# Patient Record
Sex: MALE | Race: WHITE | Employment: OTHER | URBAN - METROPOLITAN AREA
[De-identification: names, ages, dates, MRNs, and addresses within clinical notes are randomized per-mention and may not be internally consistent; named-entity substitution may affect disease eponyms.]

---

## 2021-04-26 ENCOUNTER — OFFICE VISIT (OUTPATIENT)
Dept: OPHTHALMOLOGY | Facility: CLINIC | Age: 76
End: 2021-04-26
Attending: OPHTHALMOLOGY
Payer: COMMERCIAL

## 2021-04-26 DIAGNOSIS — H35.3130 BILATERAL NONEXUDATIVE AGE-RELATED MACULAR DEGENERATION, UNSPECIFIED STAGE: Primary | ICD-10-CM

## 2021-04-26 DIAGNOSIS — E11.9 TYPE 2 DIABETES MELLITUS WITHOUT COMPLICATION, WITHOUT LONG-TERM CURRENT USE OF INSULIN (H): ICD-10-CM

## 2021-04-26 PROCEDURE — 92250 FUNDUS PHOTOGRAPHY W/I&R: CPT | Performed by: OPHTHALMOLOGY

## 2021-04-26 PROCEDURE — 92134 CPTRZ OPH DX IMG PST SGM RTA: CPT | Performed by: OPHTHALMOLOGY

## 2021-04-26 PROCEDURE — G0463 HOSPITAL OUTPT CLINIC VISIT: HCPCS

## 2021-04-26 PROCEDURE — 99203 OFFICE O/P NEW LOW 30 MIN: CPT | Performed by: OPHTHALMOLOGY

## 2021-04-26 PROCEDURE — 99207 FUNDUS AUTOFLUORESCENCE IMAGE (FAF) OU (BOTH EYES): CPT | Performed by: OPHTHALMOLOGY

## 2021-04-26 RX ORDER — ATORVASTATIN CALCIUM 10 MG/1
TABLET, FILM COATED ORAL
COMMUNITY
Start: 2021-03-20

## 2021-04-26 RX ORDER — ASPIRIN 81 MG/1
TABLET, COATED ORAL
COMMUNITY
Start: 2020-09-21

## 2021-04-26 RX ORDER — METFORMIN HCL 500 MG
TABLET, EXTENDED RELEASE 24 HR ORAL
COMMUNITY
Start: 2021-03-22

## 2021-04-26 RX ORDER — EMPAGLIFLOZIN 10 MG/1
TABLET, FILM COATED ORAL
COMMUNITY
Start: 2021-04-15

## 2021-04-26 RX ORDER — LATANOPROST 50 UG/ML
SOLUTION/ DROPS OPHTHALMIC
COMMUNITY
Start: 2021-04-26

## 2021-04-26 RX ORDER — METOPROLOL SUCCINATE 25 MG/1
TABLET, EXTENDED RELEASE ORAL
COMMUNITY
Start: 2020-12-08

## 2021-04-26 RX ORDER — LISINOPRIL 5 MG/1
TABLET ORAL
COMMUNITY
Start: 2021-01-29

## 2021-04-26 RX ORDER — TRIAMCINOLONE ACETONIDE 1 MG/G
CREAM TOPICAL
COMMUNITY
Start: 2020-11-13

## 2021-04-26 ASSESSMENT — REFRACTION_WEARINGRX
OD_SPHERE: -0.25
OD_AXIS: 171
OS_SPHERE: -0.75
OD_ADD: +3.00
OS_CYLINDER: +1.50
OS_AXIS: 017
OD_CYLINDER: +1.25
SPECS_TYPE: PAL
OS_ADD: +3.00

## 2021-04-26 ASSESSMENT — VISUAL ACUITY
CORRECTION_TYPE: GLASSES
OD_CC+: -1
METHOD: SNELLEN - LINEAR
OD_CC: 20/100
OS_CC: 20/100

## 2021-04-26 ASSESSMENT — SLIT LAMP EXAM - LIDS
COMMENTS: NORMAL
COMMENTS: NORMAL

## 2021-04-26 ASSESSMENT — EXTERNAL EXAM - LEFT EYE: OS_EXAM: NORMAL

## 2021-04-26 ASSESSMENT — TONOMETRY
OD_IOP_MMHG: 16
IOP_METHOD: TONOPEN
OS_IOP_MMHG: 18

## 2021-04-26 ASSESSMENT — EXTERNAL EXAM - RIGHT EYE: OD_EXAM: NORMAL

## 2021-04-26 ASSESSMENT — CONF VISUAL FIELD
METHOD: COUNTING FINGERS
OD_NORMAL: 1
OS_NORMAL: 1

## 2021-04-26 NOTE — PROGRESS NOTES
I have confirmed the patient's and reviewed Past Medical History, Past Surgical History, Social History, Family History, Problem List, Medication List and agree with Tech note.    CC: blurry vision both eyes     HPI: has dry Age related macular degeneration, wants to learn more about trgeatment    Assessment/plan:   1.  Dry Age related macular degeneration both eyes    - AREDS stage IV     - Patient should take AREDS vitamins   - FAF annually   - OCT annually   - Low Vision Refraction in [place], low vision clinic    - return to clinic 6 months for biopsy study, dry Age related macular degeneration    - smoking cessation, risk explained           RTC 6 wilder Matias MD PhD.  Professor & Chair

## 2021-04-26 NOTE — NURSING NOTE
Chief Complaints and History of Present Illnesses   Patient presents with     Retinal Evaluation      Chief Complaint(s) and History of Present Illness(es)     Retinal Evaluation     Laterality: both eyes    Associated symptoms: Negative for eye pain, redness, tearing, flashes and floaters              Comments     New patient evaluation of macular degeneration each eye.   Patient has history of macular degeneration each eye.  Glaucoma each eye, s/p stents each eye.  Wants to enroll in any clinical treatments for his eye conditions.  Eye meds: preservision PO, latanaprost each eye at bedtime @11pm last night  [facility] Flanagan 4/26/2021 1:34 PM

## 2021-10-14 DIAGNOSIS — E11.9 TYPE 2 DIABETES MELLITUS WITHOUT COMPLICATION, WITHOUT LONG-TERM CURRENT USE OF INSULIN (H): Primary | ICD-10-CM
